# Patient Record
Sex: FEMALE | Race: WHITE
[De-identification: names, ages, dates, MRNs, and addresses within clinical notes are randomized per-mention and may not be internally consistent; named-entity substitution may affect disease eponyms.]

---

## 2018-01-16 ENCOUNTER — HOSPITAL ENCOUNTER (EMERGENCY)
Dept: HOSPITAL 25 - UCEAST | Age: 43
Discharge: HOME | End: 2018-01-16
Payer: COMMERCIAL

## 2018-01-16 VITALS — SYSTOLIC BLOOD PRESSURE: 98 MMHG | DIASTOLIC BLOOD PRESSURE: 61 MMHG
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
## 2018-01-16 DIAGNOSIS — S60.471A: Primary | ICD-10-CM

## 2018-01-16 DIAGNOSIS — Z20.3: ICD-10-CM

## 2018-01-16 DIAGNOSIS — S60.372A: ICD-10-CM

## 2018-01-16 DIAGNOSIS — Z23: ICD-10-CM
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
## 2018-01-16 PROCEDURE — 96372 THER/PROPH/DIAG INJ SC/IM: CPT

## 2018-01-16 PROCEDURE — 90375 RABIES IG IM/SC: CPT

## 2018-01-16 PROCEDURE — 90471 IMMUNIZATION ADMIN: CPT

## 2018-01-16 PROCEDURE — G0463 HOSPITAL OUTPT CLINIC VISIT: HCPCS

## 2018-01-16 PROCEDURE — 99211 OFF/OP EST MAY X REQ PHY/QHP: CPT

## 2018-01-16 NOTE — XMS REPORT
Brit Carmona

 Created on:2017



Patient:Brit Li

Sex:Female

:1975

External Reference #:[id number]





Demographics







 Address  [address] [place] [address]

 

 Home Phone  [phone]

 

 Mobile Phone  [phone]

 

 Work Phone  [phone]

 

 Email Address  [email or web address]

 

 Preferred Language  English

 

 Marital Status  Not  Or 

 

 Scientologist Affiliation  Unknown

 

 Race  White

 

 Ethnic Group  Not  Or 









Author







 Organization  [facility]

 

 Address  [address] [place] [address]

 

 Phone  [phone]









Support







 Name  Relationship  Address  Phone

 

 Geovani Morley    Unavailable  [phone]









Care Team Providers







 Name  Role  Phone

 

 [facility]  Primary Care Physician  Unavailable









Payers







 Type  Date  Identification Numbers  Payment Provider  Subscriber

 

 Commercial    Policy Number: [id number]  [employer]/BS  Brit Mike



       [facility]  [facility]









 PayID: [id number]  [address] [address]









 [place] [address]







Problems







 Description

 

 No Active Problems







Family History







 Date  Family Member(s)  Problem(s)  Comments

 

   Father  Lymphoma  

 

   Father  Heart enlargement- r/t Chemo. therapy  

 

   Mother  A&amp;W  

 

   Number of Children  1  

 

   First Son  A&amp;W  

 

   Number of Siblings  Siblings: 4  

 

   First Brother  A&amp;W  

 

   Second Brother  A&amp;W  

 

   First Sister  Colitis  ulcerative

 

   Second Sister  A&amp;W  

 

   Paternal Grandfather  Unknown  

 

   Paternal Grandmother   due to Bladder Cancer  ()

 

   Maternal Grandfather  A&amp;W  

 

   Maternal Grandmother  A&amp;W  







Social History







 Type  Date  Description  Comments

 

 Education    Highest level of education  



     completed is 2 years of college  

 

 Marital Status    Patient is   

 

 Living Situation    Lives with spouse and son  

 

 Diet    Diet is healthy and well  



     balanced  

 

 Sleep    Typically sleeps 8 hours a  



     night  

 

 Pets    Household pets include 2 dogs  

 

 Occupation    RN  at [employer]

 

 Cigarette Use    Never smoked cigarettes  

 

 Alcohol    Rarely drinks alcohol approx  



     1xwk  

 

 Smoking    Patient is a former smoker  

 

 Drug Use    Denies drug use  

 

 Daily Caffeine    Drinks on average 1 cup of tea  



     a day, coffee average 1 cup q  



     other day, soda about one per a  



     week.  

 

 Seat Belt/Car Seat    Always uses a seat belt  

 

 Currently Active    The patient is currently  



     sexually active  

 

 Contraceptive Methods    Current methods of birth  



     control used include diaphragm  

 

 STD's    No STD history  







Allergies, Adverse Reactions, Alerts







 Date  Description  Reaction  Status  Severity  Comments

 

 2010  NKDA    active    







Medications







 Medication  Date  Status  Form  Strength  Qnty  SIG  Indications  Ordering



                 Provider

 

 Ibuprofen    Active  Tablets  600mg  30tabs  1 by    Cal Betts          mouth    Gelber,



             four    M.D.



             times a    



             day 2days    



             prior to    



             period    



             and 1st 2    



             days of    



             period    

 

 Wellbutrin    Active            Unknown



   000              

 

 Iron (Ferrous    Active            Unknown



 Gluconate)  000              

 

                 

 

 Cephalexin    Hx  Capsules  500mg  14caps  take one    Phaelon



   014 -          cap po    MD Faiza



             qid for 7    



   017          days    

 

 Percocet    Hx    5-325mg  20units  1 po q  V72.83  Cal Chau -          6hrs as    Gelber,



             needed    M.DLibertad



   014          for pain    

 

 Motrin    Hx  Tablets  600mg  30tabs  take one  V72.83  Cal Chau -          tablet    Gelber,



             q6h prn    M.DLibertad



   017          pain    

 

 Depakote  /0  Hx            Unknown



   000 -              



                 



   010              

 

 Folic Acid  0  Hx            Unknown



   000 -              



                 



   014              

 

 Risperdal  0  Hx            Unknown



   000 -              



                 



   013              

 

 Prenatal  0  Hx            Unknown



 Complete  000 -              



                 



   017              

 

 Iron  00/0  Hx            Unknown



   000 -              



                 



   017              

 

 Percocet  00/0  Hx            Unknown



   000 -              



                 



   014              

 

 Fenugreek  0  Hx  Capsules  610mg        Unknown



   000 -              



                 



   017              







Vital Signs







 Date  Vital  Result  Comment

 

 2017  BP Systolic  100 mmHg  









 BP Diastolic  60 mmHg  

 

 Height  63 inches  5'3"

 

 Weight  136.00 lb  

 

 BMI (Body Mass Index)  24.1 kg/m2  

 

 Last Menstrual Period  7983232  

 

   1  

 

 Parity  1  









 2014  BP Systolic  124 mmHg  









 BP Diastolic  70 mmHg  

 

 Height  63 inches  5'3"

 

 Weight  147.00 lb  

 

 BMI (Body Mass Index)  26.0 kg/m2  

 

   1  

 

 Parity  1  









 2014  BP Systolic  110 mmHg  









 BP Diastolic  68 mmHg  

 

 Height  63 inches  5'3"

 

 Weight  146.00 lb  

 

 BMI (Body Mass Index)  25.9 kg/m2  

 

 Last Menstrual Period  8260721  

 

   1  

 

 Parity  1  









 2014  BP Systolic  104 mmHg  









 BP Diastolic  70 mmHg  

 

 Body Temperature  98.0 F  

 

 Height  63 inches  5'3"

 

 Weight  145.00 lb  

 

 BMI (Body Mass Index)  25.7 kg/m2  

 

 Last Menstrual Period  6124618  

 

   1  

 

 Parity  1  









 2014  BP Systolic  116 mmHg  









 BP Diastolic  70 mmHg  

 

 Body Temperature  97.8 F  

 

 Heart Rate  68 /min  

 

 Respiratory Rate  14 /min  

 

 Height  63 inches  5'3"

 

 Weight  161.00 lb  

 

 BMI (Body Mass Index)  28.5 kg/m2  

 

 Last Menstrual Period  2223321  

 

   1  

 

 Parity  0  









 2014  BP Systolic  110 mmHg  









 BP Diastolic  66 mmHg  

 

 Height  63 inches  5'3"

 

 Weight  156.00 lb  

 

 BMI (Body Mass Index)  27.6 kg/m2  

 

 Last Menstrual Period  0214846  

 

   1  

 

 Parity  0  









 2010  BP Systolic  110 mmHg  









 BP Diastolic  78 mmHg  

 

 Weight  132.00 lb  

 

 Last Menstrual Period  8096072  

 

   0  









 2010  BP Systolic  100 mmHg  









 BP Diastolic  66 mmHg  

 

 Height  62.75 inches  5'2.75"

 

 Weight  129.00 lb  

 

 BMI (Body Mass Index)  23.0 kg/m2  

 

 Last Menstrual Period  6970003  

 

   0  









 2008  BP Systolic  116 mmHg  









 BP Diastolic  82 mmHg  

 

 Height  62.75 inches  5'2.75"

 

 Weight  134.00 lb  

 

 BMI (Body Mass Index)  23.9 kg/m2  

 

 Last Menstrual Period  1488216  

 

   0  

 

 Parity  0  







Results







 Test  Date  Test  Result  H/L  Range  Note

 

 Wound Culture/Sensi  2014  Wound/Misc  (SEE NOTE)      1



     Culture-Gram Stain        

 

 CBC Auto Diff  2014  White Blood Count  11.0 10^3/uL  High  4.8-10.8  









 Red Blood Count  3.32 10^6/uL  Low  4.0-5.4  

 

 Hemoglobin  11.4 g/dL  Low  12.0-16.0  

 

 Hematocrit  33 %  Low  35-47  

 

 Mean Corpuscular Volume  99 fL  High  80-97  

 

 Mean Corpuscular Hemoglobin  34 pg  High  27-31  

 

 Mean Corpuscular HGB Conc  35 g/dL    31-36  

 

 Red Cell Distribution Width  13 %    10.5-15  

 

 Platelet Count  235 10^3/uL    150-450  

 

 Mean Platelet Volume  8 um3    7.4-10.4  

 

 Abs Neutrophils  8.4 10^3/uL  High  1.5-7.7  

 

 Abs Lymphocytes  1.8 10^3/uL    1.0-4.8  

 

 Abs Monocytes  0.7 10^3/uL    0-0.8  

 

 Abs Eosinophils  0.1 10^3/uL    0-0.6  

 

 Abs Basophils  0 10^3/uL    0-0.2  

 

 Abs Nucleated RBC  0 10^3/uL      

 

 Granulocyte %  76.3 %    38-83  

 

 Lymphocyte %  16.2 %  Low  25-47  

 

 Monocyte %  6.7 %    1-9  

 

 Eosinophil %  0.6 %    0-6  

 

 Basophil %  0.2 %    0-2  

 

 Nucleated Red Blood Cells %  0      









 Type And Screen  2014  Patient Blood Type  O Negative      









 Antibody Screen  POSITIVE      









 Laboratory test finding  2014  Antibody Identification  D      









 Antibody Id Autocontrol  0      

 

 Direct Antiglobulin Test  NEGATIVE      









 Laboratory test finding  2010  Prolactin  19.29 NG/ML    1.0-25.0  

 

 Laboratory test finding  2010  FSH  5.87 MIU/ML      2









 Lutenizing Hormone  5.81 MIU/ML      3

 

 Progesterone  4.4 NG/ML      4

 

 Testosterone Total  41.8 ng/dL    10-75  

 

 Thyroid Function Cascade  1.7 mIU/L    0.3-5.0  5

 

 Prolactin  86.87 NG/ML  High  1.0-25.0  









 1  RUN DATE: 14               [facility] **LIVE**        
        PAGE    1



   RUN TIME: 831             [address] [place] [address]   [id number]



   Specimen Inquiry



   



   -----------------------------------------------------------------------------
---------------



   Name:  LIMA JEREMIAHBRIT     : 1975    Attend Dr: Douglas Kendall MD



   Acct:  [id number]  Unit: [id number]  AGE: 39            Location:  [facility]



   Re14                        SEX: F             Status:    REG REF



   -----------------------------------------------------------------------------
---------------



   



   SPEC: [id number]         SCOTT:   14-    [facility] DR: Douglas Kendall MD



   REQ:  [id number]              RECD:   



   STATUS: COMP



   _



   SOURCE: MISC SOURC     SPDESC:



   ORDERED:  Culture   Stain



   QUERIES:  Medent Number [id number]



   Specimen Description M RIGHT NIPPLE



   



   -----------------------------------------------------------------------------
---------------



   Procedure                         Result                         Verified   
        Site



   -----------------------------------------------------------------------------
---------------



   Wound/Misc Gram Stain  Final                                     14-
0745      ML



   1+ Epithelial Cells



   No Polys Observed



   



   



   1+ Gram Positive Cocci



   



   



   Wound/Misc Culture  Final                                        14-
831      ML



   



   Organism 1                     NORMAL NIESHA



   Quantity                    1+



   



   -----------------------------------------------------------------------------
---------------



   



   



   



   



   



   



   



   



   



   



   



   



   



   



   



   



   



   ** END OF REPORT **



   



   * ML=Testing performed at Main Lab



   DEPARTMENT OF PATHOLOGY,  [address] [place] [address]



   Phone # [phone]      Fax #[phone]



   Dajuan Gandara M.D. Director     [place] Permit  #[id number]

 

 2  NORMAL RANGE



   -------------------------------------------------



   MALES                              1 - 20



   NORMALLY MENSTRUATING FEMALES



   - Follicular Phase                 3 - 9



   - Mid-Cycle Peak                   4 - 23



   - Luteal Phase                     1 - 6



   POSTMENOPAUSAL FEMALES            16 - 114



   ----------------------------------------------------------



   .

 

 3  NORMAL RANGE



   --------------------------------------------------



   MALES                                 2 - 12



   NORMALLY MENSTRUATING FEMALES



   - Follicular Phase                    1 - 18



   - Mid-Cycle Peak                     24 - 105



   - Luteal Phase                      0.6 - 20



   POSTMENOPAUSAL FEMALES               15 - 62



   -----------------------------------------------------------



   .

 

 4  FEMALE REFERENCE RANGES FOR Progesterone:



   Follicular phase.......0.3 - 1.5 ng/ml



   Mid-luteal phase.......5.2 - 18.5 ng/ml



   Postmenopausal.........&lt; 0.8 ng/ml



   Pregnant



   1st trimester.........4.7 - 50.0 ng/ml



   2nd trimester.........19.4 - 45.3 ng/ml



   .

 

 5  Test Performed by:



   [facility]



   [address] [place] [address]



   : Maurisio Wheeler III, M.D.







Procedures







 Date  CPT Code  Description  Status

 

 2017    Mammogram  Completed

 

 2014  99080   Delivery Only  Completed

 

 2014  32720   Delivery Only  Completed

 

 2010  51539  Echography Pelvic Limited Or Follow-Up  Completed







Encounters







 Type  Date  Location  Provider  CPT E/M  Dx

 

 Office Visit  2014 10:00a  [facility]  Douglas Kendall MD  [address]  675.94

 

 Office Visit  2014  9:40a  [facility]  Cal Strickland M.D.  [address]  
V72.83

 

 Office Visit  2014  2:00p  [facility]  Cal Strickland M.D.  [address]  
[phone]

 

 Office Visit  2010  9:00a  [facility]  TAYLOR Healy  [address]  628.9

 

 Office Visit  2010 11:00a  [facility]  TAYLOR Healy  [address]  628.9

 

 Office Visit  2008  1:40p  [facility] Office  ITALO Whitman JR., M.D.  [address]  
218.9







Plan of Care

No Information Available

## 2018-01-16 NOTE — UC
George ROMAN Stephanie, scribed for Dara Jacome MD on 01/16/18 at 1601 .





Bite Injury/Animal HPI





- HPI Summary


HPI Summary: 


The pt is a 41 y/o F presenting to  with  bite by a feral cat on the L hand 

that occurred on 01/08/2018. Symptoms include a tooth puncture on the 4th digit 

of the L hand and a scratch on the L hand. Pt cleansed wound copiously at the 

time of injury.  pt states wounds have scabbed and are nearly resolved. no 

erythema, swelling, pain fever, red streak. PT states the [facility] attempted to 

capture kitten - was unable and pt was recommended to come to  for Rabies IG 

as well as vaccine. The pts vaccines are currently up to date and her last 

tetanus shot was in 2014. Pt is not immunocompromised. The pt denies fever, 

chills, and rash. Animal control was notified of the feral animal. 





Pt's medications reviewed at this visit














- History of Current Complaint


Chief Complaint: UCGeneralIllness


Stated Complaint: RABIES


Time Seen by Provider: 01/16/18 15:15


Hx Obtained From: Patient


Hx Last Menstrual Period: 12/25/17


Pain Intensity: 0


Pain Scale Used: 0-10 Numeric


Onset/Duration: Sudden Onset - s/p feral cat bite


Type of Bite: Animal - feral cat


Has Animal Been Immunized?: Unknown


Character: Puncture, Abrasion/Laceration


Aggravating Factor(s): Nothing


Alleviating Factor(s): Nothing


Animal Available for Observation: No


Animal Control Notified: Yes





- Allergies/Home Medications


Allergies/Adverse Reactions: 


 Allergies











Allergy/AdvReac Type Severity Reaction Status Date / Time


 


No Known Allergies Allergy   Verified 01/16/18 15:10














PMH/Surg Hx/FS Hx/Imm Hx


Previously Healthy: Yes





- Surgical History


Surgical History: Yes


Surgery Procedure, Year, and Place: 1998 - BENIGN MASS COLLAR BONE.  FIBROIDS 

REMOVED - URTERINE.  C SECTION





- Family History


Known Family History: Positive: Other -  lymphoma, ulcerative colitis


   Negative: Cardiac Disease, Hypertension, Diabetes





- Social History


Occupation: Employed Part-time


Lives: With Family


Alcohol Use: Occasionally


Substance Use Type: None


Smoking Status (MU): Never Smoked Tobacco





- Immunization History


Most Recent Influenza Vaccination: 10/2014


Most Recent Tetanus Shot: March 2014


Most Recent Pneumonia Vaccination: never





Review of Systems


Skin: Other - wound to index and thumb


All Other Systems Reviewed And Are Negative: Yes





Physical Exam


Triage Information Reviewed: Yes


Appearance: Well-Appearing, No Pain Distress, Well-Nourished


Vital Signs: 


 Initial Vital Signs











Temp  99.8 F   01/16/18 15:11


 


Pulse  62   01/16/18 15:11


 


Resp  16   01/16/18 15:11


 


BP  98/61   01/16/18 15:11


 


Pulse Ox  100   01/16/18 15:11











Vital Signs Reviewed: Yes


Eye Exam: Normal


Eyes: Positive: Conjunctiva Clear


ENT: Positive: Hearing grossly normal


Neck exam: Normal


Neck: Positive: Supple, Nontender, No Lymphadenopathy


Respiratory Exam: Normal


Respiratory: Positive: Chest non-tender, Lungs clear, Normal breath sounds, No 

respiratory distress, No accessory muscle use


Cardiovascular Exam: Normal


Cardiovascular: Positive: RRR, No Murmur, Pulses Normal


Abdominal Exam: Normal


Abdomen Description: Positive: Nontender, No Organomegaly, Soft


Bowel Sounds: Positive: Present


Musculoskeletal: Positive: Other: - Full AROM all digits MCP, PIP, DIP


Neurological Exam: Normal


Psychological Exam: Normal


Skin: Positive: Other - scab wound to index dorsum and at medial margin of 

nailbed thumb on left  no erythema, no edema, no tenderness.  full ROM





Bite Injury Course/Dx





- Course


Course Of Treatment: Pt presents for rabies IG and vaccine s/p bite from ferral 

cat 8 days ago.  wounds are healed and very well appearing.  I infiltrated IG 

at 2 wounds.  confirmed with [facility].  tdap utd.  wound well healed - no erythema, 

drainage, edema, fluctance.  no abx.  pt to f/u with [facility] on friday.  work note 

for mtg tonight





- Differential Dx/Diagnosis


Provider Diagnoses: animal bite.  rabies vaccine.  rabies immunoglobulin





Discharge





- Discharge Plan


Condition: Stable


Disposition: HOME


Patient Education Materials:  Rabies Vaccine (By injection), Rabies Immune 

Globulin (By injection)


Forms:  *Work Release


Referrals: 


Bárbara Martin DO [Primary Care Provider] - 


Additional Instructions: 


- You have been given both immuoglobulin for rabies as well as a rabies vaccine 

today. you may have some discomfort a the site of these injections. It is okay 

to take ibuprofen (Advil, Motrin) or Tylenol for discomfort


- You will need your next rabies vaccine on Friday, 1/19/2018. Contact the 

[facility] to arrange this vaccine


- Call the health department or return with any questions or concerns


Follow up with [facility] and call for an appointment 

for Friday. 














The documentation as recorded by the George schilling Stephanie accurately reflects 

the service I personally performed and the decisions made by me, Dara Jacome MD.

## 2018-01-16 NOTE — XMS REPORT
Brit Carmona

 Created on:2018



Patient:Brit Li

Sex:Female

:1975

External Reference #:[id number]





Demographics







 Address  [address] [place] [address]

 

 Home Phone  [phone]

 

 Mobile Phone  [phone]

 

 Work Phone  [phone]

 

 Email Address  [email or web address]

 

 Preferred Language  English

 

 Marital Status  Not  Or 

 

 [employer]aker Affiliation  Unknown

 

 Race  White

 

 Ethnic Group  Not  Or 









Author







 Organization  [facility]

 

 Address  [address] [address]

 

 Phone  [phone]









Support







 Name  Relationship  Address  Phone

 

 Geovani Morley  Unavailable  Unavailable  [phone]









Care Team Providers







 Name  Role  Phone

 

 Bárbara Martin DO  Primary Care Physician  Unavailable









Payers







 Type  Date  Identification Numbers  Payment Provider  Subscriber

 

 Commercial    Policy Number: [id number]  [employer]  Brit Carmona









 PayID: [id number]  [address] 









 [place] [address]









 Medigap Part B  Expires: 2017  Policy Number:  [employer]  Brit Mike



     [id number]    Kristine









 PayID: [id number]  [address] 









 [place] [address]







Problems







 Description

 

 No Information







Family History







 Date  Family Member(s)  Problem(s)  Comments

 

   General  Cancer  

 

   Father  Lymphoma  

 

   Father  Congestive Heart Failure (CHF)  d/t chemo and required



       heart transplant

 

   Mother  Depression  

 

   Siblings  4  

 

   Maternal Grandfather   due to MI  () - In his 60's

 

   Paternal Uncles  Schizophrenia  

 

   Paternal Aunts  Schizophrenia  

 

   First Paternal Cousin   due to MI  () - in his 30's







Social History







 Type  Date  Description  Comments

 

 Marital Status      

 

 Lives With      

 

 Occupation    Currently Working  

 

 Occupation    Nurse  

 

 ETOH Use    Occasionally consumes alcohol  

 

 Smoking    Patient has never smoked  

 

 Recreational Drug Use    Denies Drug Use  

 

 Daily Caffeine    Consumes on average 1 cup of regular coffee per  



     day  

 

 Exercise Type/Frequency    Exercises regularly  







Allergies, Adverse Reactions, Alerts







 Date  Description  Reaction  Status  Severity  Comments

 

 10/11/2016  NKDA    active    







Medications







 Medication  Date  Status  Form  Strength  Qnty  SIG  Indications  Ordering



                 Provider

 

 Wellbutrin SR    Active  Tablets ER  100mg    1 tab in    Unknown



   000    12HR      in the    



             morning,    



             1 po qpm    

 

 Floradix Iron    Active        1 po qd    Unknown



 Supplement  000              

 

 Melatonin    Active  Capsules  1mg    1 p q hs    Unknown



   000          prn    

 

 Risperidone    Active    0.25mg    1-2    Unknown



   000          x/year    

 

                 

 

 Naproxen  10/11/2  Hx  Tablets  500mg  90tabs  1 by  M46.1  [place] F



   016 -          mouth    Raphael,



   10/25/2          twice a    MD



   017          day as    



             needed    



             pain    

 

 Risperidone    Hx  Tablets  0.25mg    1 tab by    Unknown



   000 -          mouth    



   10/25/2          daily as    



   017          needed    

 

 Falmin    Hx  Tab  0.1mg-mcg    1 tab by    Unknown



   000 -          mouth    



   10/26/2          daily    



   017              

 

 Falmina    Hx  Tablets  0.1-20mg-m    1 by    Unknown



   000 -      cg    mouth    



             every    



   018          day    

 

 B Complex    Hx  Capsules      1-2x/wee    Unknown



   000 -              



                 



   018              







Vital Signs







 Date  Vital  Result  Comment

 

 2018  Height  62 inches  5'2"









 Weight  136.75 lb  

 

 Heart Rate  72 /min  

 

 BP Systolic Sitting  106 mmHg  

 

 BP Diastolic Sitting  66 mmHg  

 

 BMI (Body Mass Index)  25.0 kg/m2  

 

 Ejection Fraction  55-60%  echo 10/2/17









 10/26/2017  Height  62 inches  5'2"









 Weight  137.00 lb  w/o shoes

 

 Heart Rate  72 /min  

 

 BP Systolic Sitting  100 mmHg  LA reg cuff

 

 BP Diastolic Sitting  68 mmHg  LA reg cuff

 

 BMI (Body Mass Index)  25.1 kg/m2  

 

 Ejection Fraction  55% - 60%  echo 10/02/17







Results







 Test  Date  Test  Result  H/L  Range  Note

 

 Basic Metabolic Panel  2017  Sodium  134 mmol/L    133-145  









 Potassium  4.5 mmol/L    3.5-5.0  

 

 Chloride  101 mmol/L    101-111  

 

 Co2 Carbon Dioxide  26 mmol/L    22-32  

 

 Anion Gap  7 mmol/L    2-11  

 

 Glucose  84 mg/dL      

 

 Blood Urea Nitrogen  13 mg/dL    6-24  

 

 Creatinine  0.77 mg/dL    0.51-0.95  

 

 BUN/Creatinine Ratio  16.9    8-20  

 

 Calcium  9.3 mg/dL    8.6-10.3  

 

 Egfr Non-  81.4    &gt;60  

 

 Egfr   104.7    &gt;60  1









 Laboratory test finding  2017  Magnesium  2.3 mg/dL    1.9-2.7  

 

 Vitamin D 1,25 And Vitamin  2017  Vitamin D Total 25(Oh)  24.3 ng/mL    
20-50  



 D,2            









 Vitamin D, 1,25 Dihydroxy  60 pg/mL    18-78  2









 1  *******Because ethnic data is not always readily available,



   this report includes an eGFR for both -Americans and



   non- Americans.****



   The National Kidney Disease Education Program (NKDEP) does



   not endorse the use of the MDRD equation for patients that



   are not between the ages of 18 and 70, are pregnant, have



   extremes of body size, muscle mass, or nutritional status,



   or are non- or non-.



   According to the National Kidney Foundation, irrespective of



   diagnosis, the stage of the disease is based on the level of



   kidney function:



   Stage Description                      GFR(mL/min/1.73 m(2))



   1     Kidney damage with normal or decreased GFR       90



   2     Kidney damage with mild decrease in GFR          60-89



   3     Moderate decrease in GFR                         30-59



   4     Severe decrease in GFR                           15-29



   5     Kidney failure                       &lt;15 (or dialysis)

 

 2  -------------------ADDITIONAL INFORMATION-------------------



   This test was developed and its performance characteristics



   determined by [facility] in a manner consistent with CLIA



   requirements. This test has not been cleared or approved by



   the U.S. Food and Drug Administration.



   Test Performed by:



   [facility]



   [address] [place] [address]







Procedures







 Date  CPT Code  Description  Status

 

 2017  84403  Holter Monitor Review (24 hr)dr colin &amp; rosendo  
Completed



     only  

 

 2017  50017  ECG Monitor/Recording W/Visual Superimposition Scanning  
Completed

 

 2017  11348  ECHO Stress Test Incl Perf Contiuous ekg Monitoring  
Completed



     W/Phys Superv  

 

 10/26/2017  56480  EKG Tracing &amp; Interpretation  Completed

 

 10/02/2017  25994  ECHO Transthoracic, Real-Time 2D With Doppler And Color  
Completed



     Flow  

 

 2017  44100  Holter Monitor Review (24 hr)dr colin &amp; rosendo  
Completed



     only  







Encounters







 Type  Date  Location  Provider  CPT E/M  Dx

 

 Office Visit  10/26/2017  3:00p  [facility]  [place],  
[id number]  R00.2



       M.D.    









 I49.1

 

 I34.0

 

 I36.1









 Office Visit  10/11/2016  3:00p  [facility]  [place],  
[address]  M46.1



     YESICA PIERRE    







Plan of Care

2018 - Qutaybeh S. Maghaydah, M.D.R00.2 GjwmgpiyzyuwR93.1 Atrial 
premature xziwcdprxasihuQ90.0 Nonrheumatic mitral (valve) insufficiencyFollow up
:one yr ov

## 2018-01-19 ENCOUNTER — HOSPITAL ENCOUNTER (EMERGENCY)
Dept: HOSPITAL 25 - UCEAST | Age: 43
Discharge: HOME | End: 2018-01-19
Payer: COMMERCIAL

## 2018-01-19 VITALS — DIASTOLIC BLOOD PRESSURE: 55 MMHG | SYSTOLIC BLOOD PRESSURE: 110 MMHG
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
## 2018-01-19 DIAGNOSIS — Z29.14: ICD-10-CM
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
## 2018-01-19 DIAGNOSIS — T14.8XXA: Primary | ICD-10-CM

## 2018-01-19 PROCEDURE — 99211 OFF/OP EST MAY X REQ PHY/QHP: CPT

## 2018-01-19 PROCEDURE — G0463 HOSPITAL OUTPT CLINIC VISIT: HCPCS

## 2018-01-19 PROCEDURE — 90472 IMMUNIZATION ADMIN EACH ADD: CPT

## 2018-01-19 PROCEDURE — 90375 RABIES IG IM/SC: CPT

## 2018-01-19 NOTE — UC
UC General HPI





- HPI Summary


HPI Summary: 





needs additional 1 cc of Rabies Immune Globulin





- History of Current Complaint


Chief Complaint: UCBiteInjury


Stated Complaint: RABIES EXPOSURE


Time Seen by Provider: 01/19/18 16:03


Hx Obtained From: Patient


Hx Last Menstrual Period: 12/25/17


Onset/Duration: Sudden Onset - after picking up a stray kitten


Timing: Constant


Onset Severity: Moderate


Current Severity: Moderate





- Allergy/Home Medications


Allergies/Adverse Reactions: 


 Allergies











Allergy/AdvReac Type Severity Reaction Status Date / Time


 


No Known Allergies Allergy   Verified 01/16/18 15:10














PMH/Surg Hx/FS Hx/Imm Hx


Previously Healthy: Yes





- Surgical History


Surgical History: Yes


Surgery Procedure, Year, and Place: 1998 - BENIGN MASS COLLAR BONE.  FIBROIDS 

REMOVED - URTERINE.  C SECTION





- Family History


Known Family History: Positive: Other -  lymphoma, ulcerative colitis


   Negative: Cardiac Disease, Hypertension, Diabetes





- Social History


Occupation: Unemployed


Lives: With Family


Alcohol Use: Occasionally


Substance Use Type: None


Smoking Status (MU): Never Smoked Tobacco





- Immunization History


Most Recent Influenza Vaccination: 10/2014


Most Recent Tetanus Shot: March 2014


Most Recent Pneumonia Vaccination: never





Review of Systems


Constitutional: Negative


Skin: Negative


Eyes: Negative


ENT: Negative


Respiratory: Negative


Cardiovascular: Negative


Gastrointestinal: Negative


Genitourinary: Negative


Motor: Negative


Neurovascular: Negative


Musculoskeletal: Negative


Neurological: Negative


Psychological: Negative


Is Patient Immunocompromised?: No


All Other Systems Reviewed And Are Negative: Yes





Physical Exam


Triage Information Reviewed: Yes


Appearance: Well-Appearing, No Pain Distress, Well-Nourished


Vital Signs: 


 Initial Vital Signs











Temp  97.9 F   01/19/18 15:34


 


Pulse  68   01/19/18 15:34


 


Resp  18   01/19/18 15:34


 


BP  110/55   01/19/18 15:34


 


Pulse Ox  100   01/19/18 15:34











Vital Signs Reviewed: Yes


Eye Exam: Normal


Eyes: Positive: Conjunctiva Clear


ENT Exam: Normal


ENT: Positive: Normal ENT inspection, Hearing grossly normal.  Negative: Trismus

, Muffled voice, Hoarse voice, Dental tenderness


Dental Exam: Normal


Neck exam: Normal


Neck: Positive: Supple, Nontender, No Lymphadenopathy


Respiratory Exam: Normal


Respiratory: Positive: Chest non-tender, No respiratory distress, No accessory 

muscle use


Cardiovascular Exam: Normal


Cardiovascular: Positive: Brisk Capillary Refill


Musculoskeletal Exam: Normal


Musculoskeletal: Positive: Strength Intact, ROM Intact, No Edema


Neurological Exam: Normal


Neurological: Positive: Alert, Muscle Tone Normal


Psychological Exam: Normal


Skin Exam: Normal





Course/Dx





- Course


Course Of Treatment: finish RIG---follow with health department as planned





- Differential Dx - Multi-Symptom


Provider Diagnoses: bit by a stray cat - RIG





Discharge





- Discharge Plan


Condition: Stable


Disposition: HOME


Patient Education Materials:  Rabies Immune Globulin (By injection)


Referrals: 


Bárbara Martin DO [Primary Care Provider] - 


Additional Instructions: 


Follow with Health Department as planned

## 2018-05-11 ENCOUNTER — HOSPITAL ENCOUNTER (EMERGENCY)
Dept: HOSPITAL 25 - ED | Age: 43
LOS: 1 days | Discharge: HOME | End: 2018-05-12
Payer: COMMERCIAL

## 2018-05-11 DIAGNOSIS — R10.9: ICD-10-CM

## 2018-05-11 DIAGNOSIS — N83.209: Primary | ICD-10-CM

## 2018-05-11 DIAGNOSIS — D64.9: ICD-10-CM

## 2018-05-11 DIAGNOSIS — R53.83: ICD-10-CM

## 2018-05-11 LAB
BASOPHILS # BLD AUTO: 0.1 10^3/UL (ref 0–0.2)
EOSINOPHIL # BLD AUTO: 0.2 10^3/UL (ref 0–0.6)
HCT VFR BLD AUTO: 26 % (ref 35–47)
HGB BLD-MCNC: 8.5 G/DL (ref 12–16)
INR PPP/BLD: 0.94 (ref 0.77–1.02)
LYMPHOCYTES # BLD AUTO: 1.9 10^3/UL (ref 1–4.8)
MCH RBC QN AUTO: 28 PG (ref 27–31)
MCHC RBC AUTO-ENTMCNC: 33 G/DL (ref 31–36)
MCV RBC AUTO: 83 FL (ref 80–97)
MONOCYTES # BLD AUTO: 0.6 10^3/UL (ref 0–0.8)
NEUTROPHILS # BLD AUTO: 3.8 10^3/UL (ref 1.5–7.7)
NRBC # BLD AUTO: 0 10^3/UL
NRBC BLD QL AUTO: 0
PLATELET # BLD AUTO: 281 10^3/UL (ref 150–450)
RBC # BLD AUTO: 3.1 10^6/UL (ref 4–5.4)
WBC # BLD AUTO: 6.6 10^3/UL (ref 3.5–10.8)

## 2018-05-11 PROCEDURE — 83690 ASSAY OF LIPASE: CPT

## 2018-05-11 PROCEDURE — 36415 COLL VENOUS BLD VENIPUNCTURE: CPT

## 2018-05-11 PROCEDURE — 81003 URINALYSIS AUTO W/O SCOPE: CPT

## 2018-05-11 PROCEDURE — 86140 C-REACTIVE PROTEIN: CPT

## 2018-05-11 PROCEDURE — 76856 US EXAM PELVIC COMPLETE: CPT

## 2018-05-11 PROCEDURE — 84702 CHORIONIC GONADOTROPIN TEST: CPT

## 2018-05-11 PROCEDURE — 99283 EMERGENCY DEPT VISIT LOW MDM: CPT

## 2018-05-11 PROCEDURE — 96374 THER/PROPH/DIAG INJ IV PUSH: CPT

## 2018-05-11 PROCEDURE — 85025 COMPLETE CBC W/AUTO DIFF WBC: CPT

## 2018-05-11 PROCEDURE — 85730 THROMBOPLASTIN TIME PARTIAL: CPT

## 2018-05-11 PROCEDURE — 85610 PROTHROMBIN TIME: CPT

## 2018-05-11 PROCEDURE — 80053 COMPREHEN METABOLIC PANEL: CPT

## 2018-05-11 NOTE — ED
GI/ HPI





- HPI Summary


HPI Summary: 


43 Female presents with diffuse abdominal pain for the past couple days.  She 

states she gets this whenever she has her period.  She states she has history 

of anemia.  She is following the next couple weeks.  She has been taking iron.  

She states she feels very dizzy.  She has had a normal appetite.  she recently 

had a mirena place in the past couple months.  She states that ibuprofen helps 

but did not take any ibuprofen today. she states that she feels weak.  She 

states the pain comes and goes in waves.  She has a history of fibroids.  She 

denies any diarrhea constipation.  








- History of Current Complaint


Chief Complaint: EDGeneral


Time Seen by Provider: 05/11/18 21:59


Stated Complaint: DIZZINESS/ABD PAIN


Hx Last Menstrual Period: 12/25/17


Pain Intensity: 5





- Allergy/Home Medications


Allergies/Adverse Reactions: 


 Allergies











Allergy/AdvReac Type Severity Reaction Status Date / Time


 


No Known Allergies Allergy   Verified 05/11/18 20:48














PMH/Surg Hx/FS Hx/Imm Hx


Endocrine/Hematology History: 


   Denies: Hx Diabetes


Cardiovascular History: 


   Denies: Hx Hypertension, Hx Pacemaker/ICD


 History: 


   Denies: Hx Renal Disease


Sensory History: 


   Denies: Hx Hearing Aid


Psychiatric History: Reports: Hx Depression


   Denies: Hx Panic Disorder





- Surgical History


Surgery Procedure, Year, and Place: 1998 - BENIGN MASS COLLAR BONE.  FIBROIDS 

REMOVED - URTERINE.  C SECTION


Infectious Disease History: No


Infectious Disease History: 


   Denies: Traveled Outside the [place] in Last 30 Days





- Family History


Known Family History: Positive: Other -  lymphoma, ulcerative colitis


   Negative: Cardiac Disease, Hypertension, Diabetes





- Social History


Alcohol Use: Occasionally


Substance Use Type: Reports: None


Smoking Status (MU): Never Smoked Tobacco





Review of Systems


Positive: Fatigue.  Negative: Fever


Negative: Chest Pain


Negative: Shortness Of Breath


Positive: Abdominal Pain.  Negative: Vomiting, Diarrhea, Nausea


All Other Systems Reviewed And Are Negative: Yes





Physical Exam


Triage Information Reviewed: Yes


Vital Signs On Initial Exam: 


 Initial Vitals











Temp Pulse Resp BP Pulse Ox


 


 99.5 F   75   16   119/70   99 


 


 05/11/18 20:40  05/11/18 20:40  05/11/18 20:40  05/11/18 20:40  05/11/18 20:40











Vital Signs Reviewed: Yes


Appearance: Positive: Well-Appearing


Skin: Positive: Warm, Dry


Head/Face: Positive: Normal Head/Face Inspection


Eyes: Positive: Normal, Conjunctiva Clear


ENT: Positive: Pharynx normal


Respiratory/Lung Sounds: Positive: Clear to Auscultation, Breath Sounds Present


Cardiovascular: Positive: Normal, RRR


Abdomen Description: Positive: Soft, Other: - mild diffuse abdominal tenderness


Bowel Sounds: Positive: Present


Musculoskeletal: Positive: Normal


Neurological: Positive: Normal


Psychiatric: Positive: Normal





Diagnostics





- Vital Signs


 Vital Signs











  Temp Pulse Resp BP Pulse Ox


 


 05/11/18 20:40  99.5 F  75  16  119/70  99














- Laboratory


Lab Results: 


 Lab Results











  05/11/18 05/11/18 05/11/18 Range/Units





  22:38 22:38 22:38 


 


WBC  6.6    (3.5-10.8)  10^3/ul


 


RBC  3.10 L    (4.0-5.4)  10^6/ul


 


Hgb  8.5 L    (12.0-16.0)  g/dl


 


Hct  26 L    (35-47)  %


 


MCV  83    (80-97)  fL


 


MCH  28    (27-31)  pg


 


MCHC  33    (31-36)  g/dl


 


RDW  14    (10.5-15)  %


 


Plt Count  281    (150-450)  10^3/ul


 


MPV  7.5    (7.4-10.4)  um3


 


Neut % (Auto)  57.6    (38-83)  %


 


Lymph % (Auto)  28.7    (25-47)  %


 


Mono % (Auto)  9.4 H    (0-7)  %


 


Eos % (Auto)  3.3    (0-6)  %


 


Baso % (Auto)  1.0    (0-2)  %


 


Absolute Neuts (auto)  3.8    (1.5-7.7)  10^3/ul


 


Absolute Lymphs (auto)  1.9    (1.0-4.8)  10^3/ul


 


Absolute Monos (auto)  0.6    (0-0.8)  10^3/ul


 


Absolute Eos (auto)  0.2    (0-0.6)  10^3/ul


 


Absolute Basos (auto)  0.1    (0-0.2)  10^3/ul


 


Absolute Nucleated RBC  0    10^3/ul


 


Nucleated RBC %  0    


 


INR (Anticoag Therapy)    0.94  (0.77-1.02)  


 


APTT    38.1 H  (26.0-36.3)  seconds


 


Sodium   140   (139-145)  mmol/L


 


Potassium   3.8   (3.5-5.0)  mmol/L


 


Chloride   109   (101-111)  mmol/L


 


Carbon Dioxide   25   (22-32)  mmol/L


 


Anion Gap   6   (2-11)  mmol/L


 


BUN   13   (6-24)  mg/dL


 


Creatinine   0.76   (0.51-0.95)  mg/dL


 


Est GFR ( Amer)   105.8   (>60)  


 


Est GFR (Non-Af Amer)   82.3   (>60)  


 


BUN/Creatinine Ratio   17.1   (8-20)  


 


Glucose   108 H   ()  mg/dL


 


Calcium   9.0   (8.6-10.3)  mg/dL


 


Total Bilirubin   0.20   (0.2-1.0)  mg/dL


 


AST   16   (13-39)  U/L


 


ALT   11   (7-52)  U/L


 


Alkaline Phosphatase   35   ()  U/L


 


C-Reactive Protein   < 1.00   (< 5.00)  mg/L


 


Total Protein   6.5   (6.4-8.9)  g/dL


 


Albumin   4.1   (3.2-5.2)  g/dL


 


Globulin   2.4   (2-4)  g/dL


 


Albumin/Globulin Ratio   1.7   (1-3)  


 


Lipase   15   (11.0-82.0)  U/L


 


Beta HCG, Quant   Pending   


 


Urine Color     


 


Urine Appearance     


 


Urine pH     (5-9)  


 


Ur Specific Gravity     (1.010-1.030)  


 


Urine Protein     (Negative)  


 


Urine Ketones     (Negative)  


 


Urine Blood     (Negative)  


 


Urine Nitrate     (Negative)  


 


Urine Bilirubin     (Negative)  


 


Urine Urobilinogen     (Negative)  


 


Ur Leukocyte Esterase     (Negative)  


 


Urine Glucose     (Negative)  














  05/11/18 Range/Units





  22:46 


 


WBC   (3.5-10.8)  10^3/ul


 


RBC   (4.0-5.4)  10^6/ul


 


Hgb   (12.0-16.0)  g/dl


 


Hct   (35-47)  %


 


MCV   (80-97)  fL


 


MCH   (27-31)  pg


 


MCHC   (31-36)  g/dl


 


RDW   (10.5-15)  %


 


Plt Count   (150-450)  10^3/ul


 


MPV   (7.4-10.4)  um3


 


Neut % (Auto)   (38-83)  %


 


Lymph % (Auto)   (25-47)  %


 


Mono % (Auto)   (0-7)  %


 


Eos % (Auto)   (0-6)  %


 


Baso % (Auto)   (0-2)  %


 


Absolute Neuts (auto)   (1.5-7.7)  10^3/ul


 


Absolute Lymphs (auto)   (1.0-4.8)  10^3/ul


 


Absolute Monos (auto)   (0-0.8)  10^3/ul


 


Absolute Eos (auto)   (0-0.6)  10^3/ul


 


Absolute Basos (auto)   (0-0.2)  10^3/ul


 


Absolute Nucleated RBC   10^3/ul


 


Nucleated RBC %   


 


INR (Anticoag Therapy)   (0.77-1.02)  


 


APTT   (26.0-36.3)  seconds


 


Sodium   (139-145)  mmol/L


 


Potassium   (3.5-5.0)  mmol/L


 


Chloride   (101-111)  mmol/L


 


Carbon Dioxide   (22-32)  mmol/L


 


Anion Gap   (2-11)  mmol/L


 


BUN   (6-24)  mg/dL


 


Creatinine   (0.51-0.95)  mg/dL


 


Est GFR ( Amer)   (>60)  


 


Est GFR (Non-Af Amer)   (>60)  


 


BUN/Creatinine Ratio   (8-20)  


 


Glucose   ()  mg/dL


 


Calcium   (8.6-10.3)  mg/dL


 


Total Bilirubin   (0.2-1.0)  mg/dL


 


AST   (13-39)  U/L


 


ALT   (7-52)  U/L


 


Alkaline Phosphatase   ()  U/L


 


C-Reactive Protein   (< 5.00)  mg/L


 


Total Protein   (6.4-8.9)  g/dL


 


Albumin   (3.2-5.2)  g/dL


 


Globulin   (2-4)  g/dL


 


Albumin/Globulin Ratio   (1-3)  


 


Lipase   (11.0-82.0)  U/L


 


Beta HCG, Quant   


 


Urine Color  Straw  


 


Urine Appearance  Clear  


 


Urine pH  7.0  (5-9)  


 


Ur Specific Gravity  1.010  (1.010-1.030)  


 


Urine Protein  Negative  (Negative)  


 


Urine Ketones  Negative  (Negative)  


 


Urine Blood  Negative  (Negative)  


 


Urine Nitrate  Negative  (Negative)  


 


Urine Bilirubin  Negative  (Negative)  


 


Urine Urobilinogen  Negative  (Negative)  


 


Ur Leukocyte Esterase  Negative  (Negative)  


 


Urine Glucose  Negative  (Negative)  











Result Diagrams: 


 05/11/18 22:38





 05/11/18 22:38


Lab Statement: Any lab studies that have been ordered have been reviewed, and 

results considered in the medical decision making process.





- Ultrasound


  ** No standard instances


Ultrasound Interpretation: Positive (See Comments) - Right ovarian cyst, 

fibroid uterus


Ultrasound Interpretation Completed By: Radiologist





Re-Evaluation





- Re-Evaluation


  ** First Eval


Re-Evaluation Time: 23:59


Change: Improved


Comment: feeling better after toradol





GIGU Course/Dx





- Course


Course Of Treatment: 43 Female presents with diffuse abdominal pain for the 

past couple days.  She states she gets this whenever she has her period.  She 

states she has history of anemia.  She is following the next couple weeks.  She 

has been taking iron.  She states she feels very dizzy.  She has had a normal 

appetite.  she recently had a mirena place in the past couple months.  She 

states that ibuprofen helps but did not take any ibuprofen today. she states 

that she feels weak.  She states the pain comes and goes in waves.  She has a 

history of fibroids.  She denies any diarrhea constipation.  On exam has mild 

diffuse abdominal tenderness.  white blood cell count normal.  Hemoglobin is 

8.5 which is below but lower than normal. crp normal. ultrasound shows ovarian 

cysts. which would explained right sided abdominal pain.  Told continue taking 

ibuprofen.  Told to follow-up with GYN.  Patient understands agrees with plan.





- Diagnoses


Differential Diagnoses - Female: Endometriosis, Gastroenteritis (Viral), 

Ovarian Cyst


Provider Diagnoses: 


 Anemia, Abdominal pain, Ovarian cyst








Discharge





- Sign-Out/Discharge


Documenting (check all that apply): Discharge/Admit/Transfer





- Discharge Plan


Condition: Good


Disposition: HOME


Patient Education Materials:  Ovarian Cyst (ED)


Referrals: 


Bárbara Martin DO [Primary Care Provider] - 


Additional Instructions: 


Follow up with gyn


Take tyenlol and ibuprofen every 6 hours


Drink plenty of fluids 


take iron twice a day


Return to ED if develop any new or worsening symptoms





- Billing Disposition and Condition


Condition: GOOD


Disposition: HOME

## 2018-05-12 VITALS — SYSTOLIC BLOOD PRESSURE: 108 MMHG | DIASTOLIC BLOOD PRESSURE: 64 MMHG

## 2018-05-12 NOTE — RAD
Indication: Pelvic pain.



Real-time sonography of the pelvis was performed.



The uterus measures 11.6 x 6.5 x 8.0 cm. This likely a large submucosal fibroid present

measuring 4.8 x 4.2 x 3.5 cm. The endometrial stripe likely is displaced towards the right

measuring approximately 11 mm.



Right ovary measures 4.0 x 2.6 x 4.8 cm with a cyst in the right ovary measuring 2.8 x 2.9

x 1.9 cm.



The left ovary measures 3.7 x 2.1 x 3.5 cm.



No evidence of free fluid is noted. Doppler interrogation demonstrates flow in both

ovaries.



IMPRESSION: ENDOMETRIAL FIBROID WITH DISPLACEMENT OF THE ENDOMETRIAL ECHOES. SUBMUCOSAL

FIBROID MEASURES 4.8 X 4.2 X 3.5 CM. RIGHT OVARIAN CYST MEASURING UP TO 2.9 CM.

## 2018-06-06 ENCOUNTER — HOSPITAL ENCOUNTER (OUTPATIENT)
Dept: HOSPITAL 25 - OR | Age: 43
Discharge: HOME | End: 2018-06-06
Attending: OBSTETRICS & GYNECOLOGY
Payer: COMMERCIAL

## 2018-06-06 VITALS — SYSTOLIC BLOOD PRESSURE: 102 MMHG | DIASTOLIC BLOOD PRESSURE: 67 MMHG

## 2018-06-06 DIAGNOSIS — D25.0: ICD-10-CM

## 2018-06-06 DIAGNOSIS — R00.2: ICD-10-CM

## 2018-06-06 DIAGNOSIS — F41.9: ICD-10-CM

## 2018-06-06 DIAGNOSIS — R00.0: ICD-10-CM

## 2018-06-06 DIAGNOSIS — F31.9: ICD-10-CM

## 2018-06-06 DIAGNOSIS — D50.9: ICD-10-CM

## 2018-06-06 DIAGNOSIS — I34.0: ICD-10-CM

## 2018-06-06 DIAGNOSIS — Z87.891: ICD-10-CM

## 2018-06-06 DIAGNOSIS — N92.1: Primary | ICD-10-CM

## 2018-06-06 PROCEDURE — 88305 TISSUE EXAM BY PATHOLOGIST: CPT

## 2018-06-07 NOTE — OP
DATE OF OPERATION:  06/06/18 - [facility]

 

DATE OF BIRTH:  [birth year]

 

SURGEON:  Cal Strickland MD

 

ANESTHESIA:  General, endotracheal tube.

 

PRE-OP DIAGNOSIS:  Menorrhagia.

 

POST-OP DIAGNOSES:  Menorrhagia and submucous fibroid.

 

OPERATIVE PROCEDURE:  D and C, hysteroscopy, possible ablation.

 

COMPLICATIONS:  None.

 

FINDINGS:  On exam under anesthesia, the uterus was mid position.  Cervix, 
vagina, and vulva appeared normal.  On sounding, the uterus sounded to 13 cm.  
On hysteroscopy, the whole cavity could not be well visualized.  There was a 
large rounded tissue mass in the uterine cavity that blocked the ability to see 
the tubal ostia and looked to be approximately 5 cm in size, correlating with a 
fibroid seen on the ultrasound.

 

DESCRIPTION OF PROCEDURE:  The patient identified, procedure identified as a D 
and C hysteroscopy.  The patient was taken to the operating room and prepped 
and draped in the usual fashion in dorsal lithotomy position under general 
anesthesia.  Two single-tooth tenaculums were placed on the anterior lip of the 
cervix.  Cervix was sounded 13 cm, hysteroscope was inserted and the above 
findings were noted. Hysteroscope was removed and a sharp curette was inserted 
and sharp curettage performed until a gritty sensation was felt throughout the 
circumference.  Because there was a large fibroid, the decision was made not to 
proceed with an endometrial ablation.  All sponge and instrument counts were 
correct and the patient returned to the recovery room in stable condition.

 

 [id number]/CPS #: [id number]

MTDD

## 2019-12-22 NOTE — XMS REPORT
Brit Carmona

 Created on:May 7, 2018



Patient:Brit Huggins

Sex:Female

:1975

External Reference #:[id number]





Demographics







 Address  [address] [place] [address]

 

 Home Phone  [phone]

 

 Mobile Phone  [phone]

 

 Work Phone  [phone]

 

 Email Address  [email or web address]

 

 Preferred Language  English

 

 Marital Status  Not  Or 

 

 Spiritism Affiliation  Unknown

 

 Race  White

 

 Ethnic Group  Not  Or 









Author







 Organization  [facility]

 

 Address  [address] [place] [address]

 

 Phone  [phone]









Support







 Name  Relationship  Address  Phone

 

 Geovani Morley    Unavailable  [phone]









Care Team Providers







 Name  Role  Phone

 

 Brenda Rollins M.D.  Care Team Information   Unavailable









Payers







 Type  Date  Identification Numbers  Payment Provider  Subscriber

 

 Commercial    Policy Number: [id number]  [employer]  Brit Mike



       [facility]  Frank









 PayID: [id number]  [address] [address]









 [place] [address]







Problems







 Description

 

 No Active Problems







Family History







 Date  Family Member(s)  Problem(s)  Comments

 

   Father  Lymphoma  

 

   Father  Heart enlargement- r/t Chemo. therapy  

 

   Mother  A&amp;W  

 

   Number of Children  1  

 

   First Son  A&amp;W  

 

   Number of Siblings  Siblings: 4  

 

   First Brother  A&amp;W  

 

   Second Brother  A&amp;W  

 

   First Sister  Colitis  ulcerative

 

   Second Sister  A&amp;W  

 

   Paternal Grandfather  Unknown  

 

   Paternal Grandmother   due to Bladder Cancer  ()

 

   Maternal Grandfather  A&amp;W  

 

   Maternal Grandmother  A&amp;W  







Social History







 Type  Date  Description  Comments

 

 Education    Highest level of education  



     completed is 2 years of college  

 

 Marital Status    Patient is   

 

 Living Situation    Lives with spouse and son  

 

 Diet    Diet is healthy and well  



     balanced  

 

 Occupation    RN  at [employer]



       [employer]

 

 Cigarette Use    Never smoked cigarettes  

 

 Alcohol    Rarely drinks alcohol  

 

 Smoking    Patient is a former smoker  

 

 Drug Use    Denies drug use  

 

 Daily Caffeine    Drinks on average 1 cup of tea a  



     day, coffee average 1 cup q  



     other day, soda about one per a  



     week.  

 

 Seat Belt/Car Seat    Always uses a seat belt  

 

 Currently Active    The patient is currently  



     sexually active  

 

 Contraceptive Methods    Current methods of birth control  



     used include levonorgestrel Ius  

 

 STD's    No STD history  







Allergies, Adverse Reactions, Alerts







 Date  Description  Reaction  Status  Severity  Comments

 

 2010  NKDA    active    







Medications







 Medication  Date  Status  Form  Strength  Qnty  SIG  Indications  Ordering



                 Provider

 

 Mirena (52 MG)    Active  IUD  20mcg/24HR        Cal Strickland M.D.

 

 Ibuprofen    Active  Tablets  600mg  30tabs  1 by    Cal Betts          mouth    Gelber,



             four    M.D.



             times a    



             day    



             2days    



             prior to    



             period    



             and 1st    



             2 days    



             of    



             period    

 

 Wellbutrin    Active            Unknown



   000              

 

 Iron (Ferrous    Active            Unknown



 Gluconate)  000              

 

                 

 

 Norethindrone    Hx  Tablets  5mg  30tabs  1 by    Cal Keenan  018 -          mouth    Gelber,



             every    M.D.



   018          day    

 

 Cephalexin    Hx  Capsules  500mg  14caps  take one    Phaelon



   014 -          cap po    MD Faiza



             qid for    



   017          7 days    

 

 Percocet    Hx    5-325mg  20unit  1 po q  V72.83  Cal A.



   014 -        s  6hrs as    Gelber,



             needed    M.DLibertad



   014          for pain    

 

 Motrin    Hx  Tablets  600mg  30tabs  take one  V72.83  Cal ALibertad



   014 -          tablet    Gelber,



             q6h prn    M.DLibertad



   017          pain    

 

 Depakote  0  Hx            Unknown



   000 -              



                 



   010              

 

 Folic Acid    Hx            Unknown



   000 -              



                 



   014              

 

 Risperdal  0  Hx            Unknown



   000 -              



                 



   013              

 

 Prenatal  0  Hx            Unknown



 Complete  000 -              



                 



   017              

 

 Iron  0  Hx            Unknown



   000 -              



                 



   017              

 

 Percocet  0  Hx            Unknown



   000 -              



                 



   014              

 

 Fenugreek    Hx  Capsules  610mg        Unknown



   000 -              



                 



   017              







Medications Administered in Office







 Medication  Date  Status  Form  Strength  Qnty  SIG  Indications  Ordering



                 Provider

 

 PT SCRN Tbco    Administered  Injection          Cal A.



 Id as Non User  018              AUBREE Strickland

 

 PT SCRN Tbco    Administered  Injection          Cal LEYVALibertad



 Id as Non User  018              AUBREE Strickland







Vital Signs







 Date  Vital  Result  Comment

 

 2018  BP Systolic  102 mmHg  









 BP Diastolic  68 mmHg  

 

 Height  63 inches  5'3"

 

 Weight  141.00 lb  

 

 BMI (Body Mass Index)  25.0 kg/m2  

 

 Last Menstrual Period  5017655  

 

   1  

 

 Parity  1  









 2018  BP Systolic  120 mmHg  









 BP Diastolic  62 mmHg  

 

 Height  63 inches  5'3"

 

 Weight  134.00 lb  

 

 BMI (Body Mass Index)  23.7 kg/m2  

 

 Last Menstrual Period  3058463  

 

   1  

 

 Parity  1  









 2018  BP Systolic  118 mmHg  









 BP Diastolic  80 mmHg  

 

 Height  63 inches  5'3"

 

 Weight  136.00 lb  

 

 BMI (Body Mass Index)  24.1 kg/m2  

 

 Last Menstrual Period  6532415  

 

   1  

 

 Parity  1  









 2017  BP Systolic  100 mmHg  









 BP Diastolic  60 mmHg  

 

 Height  63 inches  5'3"

 

 Weight  136.00 lb  

 

 BMI (Body Mass Index)  24.1 kg/m2  

 

 Last Menstrual Period  4711739  

 

   1  

 

 Parity  1  









 2014  BP Systolic  124 mmHg  









 BP Diastolic  70 mmHg  

 

 Height  63 inches  5'3"

 

 Weight  147.00 lb  

 

 BMI (Body Mass Index)  26.0 kg/m2  

 

   1  

 

 Parity  1  









 2014  BP Systolic  110 mmHg  









 BP Diastolic  68 mmHg  

 

 Height  63 inches  5'3"

 

 Weight  146.00 lb  

 

 BMI (Body Mass Index)  25.9 kg/m2  

 

 Last Menstrual Period  9268532  

 

   1  

 

 Parity  1  









 2014  BP Systolic  104 mmHg  









 BP Diastolic  70 mmHg  

 

 Body Temperature  98.0 F  

 

 Height  63 inches  5'3"

 

 Weight  145.00 lb  

 

 BMI (Body Mass Index)  25.7 kg/m2  

 

 Last Menstrual Period  9277927  

 

   1  

 

 Parity  1  









 2014  BP Systolic  116 mmHg  









 BP Diastolic  70 mmHg  

 

 Body Temperature  97.8 F  

 

 Heart Rate  68 /min  

 

 Respiratory Rate  14 /min  

 

 Height  63 inches  5'3"

 

 Weight  161.00 lb  

 

 BMI (Body Mass Index)  28.5 kg/m2  

 

 Last Menstrual Period  1634187  

 

   1  

 

 Parity  0  









 2014  BP Systolic  110 mmHg  









 BP Diastolic  66 mmHg  

 

 Height  63 inches  5'3"

 

 Weight  156.00 lb  

 

 BMI (Body Mass Index)  27.6 kg/m2  

 

 Last Menstrual Period  7048092  

 

   1  

 

 Parity  0  









 2010  BP Systolic  110 mmHg  









 BP Diastolic  78 mmHg  

 

 Weight  132.00 lb  

 

 Last Menstrual Period  5988769  

 

   0  









 2010  BP Systolic  100 mmHg  









 BP Diastolic  66 mmHg  

 

 Height  62.75 inches  5'2.75"

 

 Weight  129.00 lb  

 

 BMI (Body Mass Index)  23.0 kg/m2  

 

 Last Menstrual Period  0463882  

 

   0  









 2008  BP Systolic  116 mmHg  









 BP Diastolic  82 mmHg  

 

 Height  62.75 inches  5'2.75"

 

 Weight  134.00 lb  

 

 BMI (Body Mass Index)  23.9 kg/m2  

 

 Last Menstrual Period  3811549  

 

   0  

 

 Parity  0  







Results







 Test  Date  Test  Result  H/L  Range  Note

 

 Laboratory test  2018  Surgical Pathology  SEE RESULT BELOW      1



 finding            

 

 Wound Culture/Sensi  2014  Wound/Misc  (SEE NOTE)      2



     Culture-Gram Stain        

 

 CBC Auto Diff  2014  White Blood Count  11.0 10^3/uL  High  4.8-10.8  









 Red Blood Count  3.32 10^6/uL  Low  4.0-5.4  

 

 Hemoglobin  11.4 g/dL  Low  12.0-16.0  

 

 Hematocrit  33 %  Low  35-47  

 

 Mean Corpuscular Volume  99 fL  High  80-97  

 

 Mean Corpuscular Hemoglobin  34 pg  High  27-31  

 

 Mean Corpuscular HGB Conc  35 g/dL    31-36  

 

 Red Cell Distribution Width  13 %    10.5-15  

 

 Platelet Count  235 10^3/uL    150-450  

 

 Mean Platelet Volume  8 um3    7.4-10.4  

 

 Abs Neutrophils  8.4 10^3/uL  High  1.5-7.7  

 

 Abs Lymphocytes  1.8 10^3/uL    1.0-4.8  

 

 Abs Monocytes  0.7 10^3/uL    0-0.8  

 

 Abs Eosinophils  0.1 10^3/uL    0-0.6  

 

 Abs Basophils  0 10^3/uL    0-0.2  

 

 Abs Nucleated RBC  0 10^3/uL      

 

 Granulocyte %  76.3 %    38-83  

 

 Lymphocyte %  16.2 %  Low  25-47  

 

 Monocyte %  6.7 %    1-9  

 

 Eosinophil %  0.6 %    0-6  

 

 Basophil %  0.2 %    0-2  

 

 Nucleated Red Blood Cells %  0      









 Type And Screen  2014  Patient Blood Type  O Negative      









 Antibody Screen  POSITIVE      









 Laboratory test finding  2014  Antibody Identification  D      









 Antibody Id Autocontrol  0      

 

 Direct Antiglobulin Test  NEGATIVE      









 Laboratory test finding  2010  Prolactin  19.29 NG/ML    1.0-25.0  

 

 Laboratory test finding  2010  FSH  5.87 MIU/ML      3









 Lutenizing Hormone  5.81 MIU/ML      4

 

 Progesterone  4.4 NG/ML      5

 

 Testosterone Total  41.8 ng/dL    10-75  

 

 Thyroid Function Cascade  1.7 mIU/L    0.3-5.0  6

 

 Prolactin  86.87 NG/ML  High  1.0-25.0  









 1  SEE RESULT BELOW



   -----------------------------------------------------------------------------
---------------



   Name:  BRIT HUGGINS     : 1975    Attend Dr: Cal Strickland MD



   Acct:  [id number]  Unit: [id number]  AGE: 42            Location:  [facility]



   Re18                        SEX: F             Status:    REG REF



   -----------------------------------------------------------------------------
---------------



   



   SPEC: [id number]             SCOTT:       [facility] DR: Cal Strickland MD



   REQ:  [id number]             RECD: 



   STATUS: SOUT



   _



   ORDERED:  LEVEL 4



   COMMENTS: [id number]



   



   FINAL DIAGNOSIS



   



   



   Uterus, endometrium, biopsy:



   -- Benign endometrial polyp (s).



   -- Proliferative endometrium.



   -- No evidence of hyperplasia or neoplasia identified.



   



   



   



   PRE-OPERATIVE DIAGNOSIS



   



   Dysfunctional uterine bleeding



   



   GROSS DESCRIPTION



   



   The specimen is received in formalin labeled, EM BX, and consists of a 2.1 x 
1.7 by up to



   0.5 cm aggregate of tan-white irregular soft tissue fragments admixed with 
red-brown blood



   clot and blood tinged mucus which is filtered and entirely submitted in one 
cassette.



   



   Signed __________(signature on file)___________ Dajuan Gandara MD  [id number]



   



   -----------------------------------------------------------------------------
---------------



   



   



   



   



   



   



   



   



   



   



   



   



   



   ** END OF REPORT **



   



   DEPARTMENT OF PATHOLOGY,  [facility] [address] [place], [place] [address]



   Phone # [phone]      Fax #[phone]



   Dajuan Gandara M.D. Director     [facility] # [id number]

 

 2  RUN DATE: 14               [facility] **LIVE**        
        PAGE    1



   RUN TIME: 831             [facility] [address] [place], [place]   [address]



   Specimen Inquiry



   



   -----------------------------------------------------------------------------
---------------



   Name:  BRIT HUGGINS     : 1975    Attend Dr: Douglas Kendall MD



   Acct:  [id number]  Unit: [id number]  AGE: 39            Location:  [facility]



   Re14                        SEX: F             Status:    REG REF



   -----------------------------------------------------------------------------
---------------



   



   SPEC: [id number]         SCOTT:       [facility] DR: Douglas Kendall MD



   REQ:  [id number]              RECD:   



   STATUS: COMP



   _



   SOURCE: MISC SOURC     SPDESC:



   ORDERED:  Culture   Stain



   QUERIES:  Medent Number [id number]



   Specimen Description M RIGHT NIPPLE



   



   -----------------------------------------------------------------------------
---------------



   Procedure                         Result                         Verified   
        Site



   -----------------------------------------------------------------------------
---------------



   Wound/Misc Gram Stain  Final                                     14-
0745      ML



   1+ Epithelial Cells



   No Polys Observed



   



   



   1+ Gram Positive Cocci



   



   



   Wound/Misc Culture  Final                                        14-
0831      ML



   



   Organism 1                     NORMAL NIESHA



   Quantity                    1+



   



   -----------------------------------------------------------------------------
---------------



   



   



   



   



   



   



   



   



   



   



   



   



   



   



   



   



   



   ** END OF REPORT **



   



   * ML=Testing performed at Main Lab



   DEPARTMENT OF PATHOLOGY,  [address] [place] [address]



   Phone # [phone]      Fax #[phone]



   Dajuan Gandara M.D. Director     [place] Permit  #[id number]

 

 3  NORMAL RANGE



   -------------------------------------------------



   MALES                              1 - 20



   NORMALLY MENSTRUATING FEMALES



   - Follicular Phase                 3 - 9



   - Mid-Cycle Peak                   4 - 23



   - Luteal Phase                     1 - 6



   POSTMENOPAUSAL FEMALES            16 - 114



   ----------------------------------------------------------



   .

 

 4  NORMAL RANGE



   --------------------------------------------------



   MALES                                 2 - 12



   NORMALLY MENSTRUATING FEMALES



   - Follicular Phase                    1 - 18



   - Mid-Cycle Peak                     24 - 105



   - Luteal Phase                      0.6 - 20



   POSTMENOPAUSAL FEMALES               15 - 62



   -----------------------------------------------------------



   .

 

 5  FEMALE REFERENCE RANGES FOR Progesterone:



   Follicular phase.......0.3 - 1.5 ng/ml



   Mid-luteal phase.......5.2 - 18.5 ng/ml



   Postmenopausal.........&lt; 0.8 ng/ml



   Pregnant



   1st trimester.........4.7 - 50.0 ng/ml



   2nd trimester.........19.4 - 45.3 ng/ml



   .

 

 6  Test Performed by:



   [facility]



   [address] [place] [address]



   : Maurisio Wheeler III, M.D.







Procedures







 Date  CPT Code  Description  Status

 

 2018  91303  Hysteroscopy,D&amp;C  Completed

 

 2018  10152  Insert Intrauterine Device  Completed

 

 2017    Mammogram  Completed

 

 2014  36809   Delivery Only  Completed

 

 2014  33916   Delivery Only  Completed

 

 2010  34374  Echography Pelvic Limited Or Follow-Up  Completed







Encounters







 Type  Date  Location  Provider  CPT E/M  Dx

 

 Office Visit  2018  9:40a  [facility]  Cal Strickland M.D.  [address]  
N92.0

 

 Office Visit  2017 10:00a  [facility]  Cal Strickland M.D.  [address]  
N92.0









 Z30.8

 

 D50.9









 Office Visit  2014 10:00a  [facility] Office  Douglas Kendall [place]  [address]  675.94

 

 Office Visit  2014  9:40a  [facility]  Cal Strickland M.D.  [address]  
V72.83

 

 Office Visit  2014  2:00p  [facility]  Cal Strickland M.D.  [address]  
621.8

 

 Office Visit  2010  9:00a  [facility]  TAYLOR Healy  [address]  628.9

 

 Office Visit  2010 11:00a  [facility]  TAYLOR Healy  [address]843  628.9

 

 Office Visit  2008  1:40p  [facility] Office  ITALO Whitman JR., M.D.  [address]  
218.9







Plan of Care

No Information Available No